# Patient Record
Sex: FEMALE | Race: WHITE | NOT HISPANIC OR LATINO | Employment: FULL TIME
[De-identification: names, ages, dates, MRNs, and addresses within clinical notes are randomized per-mention and may not be internally consistent; named-entity substitution may affect disease eponyms.]

---

## 2018-01-26 ENCOUNTER — HEALTH MAINTENANCE LETTER (OUTPATIENT)
Age: 48
End: 2018-01-26

## 2018-10-22 ENCOUNTER — TRANSFERRED RECORDS (OUTPATIENT)
Dept: HEALTH INFORMATION MANAGEMENT | Facility: CLINIC | Age: 48
End: 2018-10-22

## 2018-10-22 LAB — RETINOPATHY: NEGATIVE

## 2018-11-07 NOTE — PROGRESS NOTES
SUBJECTIVE:   CC: Flores Koroma is an 47 year old woman who presents for preventive health visit.     Physical   Annual:     Getting at least 3 servings of Calcium per day:  Yes    Bi-annual eye exam:  Yes    Dental care twice a year:  Yes    Sleep apnea or symptoms of sleep apnea:  None and Excessive snoring    Diet:  Carbohydrate counting    Frequency of exercise:  2-3 days/week    Duration of exercise:  15-30 minutes    Taking medications regularly:  Not Applicable    Medication side effects:  Not applicable    Additional concerns today:  Yes    Diabetes Follow-up      Patient is checking blood sugars: rarely.  Results range from 125 to 95 - 100    Diabetic concerns: None     Symptoms of hypoglycemia (low blood sugar): none     Paresthesias (numbness or burning in feet) or sores: No     Date of last diabetic eye exam: 3 weeks ago at [facility]. 10/22/2018     Near sighted / no changes.     BP Readings from Last 2 Encounters:   11/13/18 120/84   12/29/14 119/61     Hemoglobin A1C (%)   Date Value   11/25/2014 5.5   08/26/2013 5.5     LDL Cholesterol Calculated (mg/dL)   Date Value   11/25/2014 124   07/11/2014 126       Diabetes Management Resources  Hyperlipidemia Follow-Up      Rate your low fat/cholesterol diet?: good    Taking statin?  No    Other lipid medications/supplements?:  None    Hemorrhoids   She complains of having hemorrhoids recently. The patient notes that she is getting more hemorrhoids and she is not sure why. She notes that after she mows the lawn, she will get hemorrhoids. The patient states that she feels them, but they do not hurt. She also states that she had bleeding a month ago due to her hemorrhoids.      Menopause  The patient states that she is having menopause symptoms now and having hot flashes. She states that her Menopause started between 1.5 to 2 years ago. The patient states that she will occasionally get her period, and her most recent one was 2 weeks ago.      Stomach issues  The patient states that she had an ear infection last January and went to the [facility] clinic. She was prescribed Amoxicillin and it destroyed her stomach. She states that she is using probiotics to help, but her stomach has been irritating her since. The patient also states that she has diarrhea, but it may be due to her dumping syndrome.     Weight gain  The patient states that she is still gaining weight in her stomach area. She also states that she is doing a low carb diet and trying to eat better with her . She notes that she walks for exercise and mows the lawn. The patient states that she might try strength training.     Boils and eczema   The patient states that when she was a child and went through puberty, she had bad boils and eczema. She notes that her boils and eczema has came back and it may be due to menopause. She notes that the eczema is with blisters. The patient states that her boils will occur at random places on her body.     Blood sugar  The patient notes that she will check her blood sugar in the morning, and get a reading around 130. She is unsure why her morning blood sugar reading is so high. She states that after she eats, her blood sugar is between .     Cholesterol   The patient states that she is not interested in getting her cholesterol checked. She complains that her cholesterol has always been over 200 and she would not take cholesterol medication if she was prescribed it.     Today's PHQ-2 Score:   PHQ-2 ( 1999 Pf[employer]) 11/13/2018   Q1: Little interest or pleasure in doing things 0   Q2: Feeling down, depressed or hopeless 0   PHQ-2 Score 0   Q1: Little interest or pleasure in doing things Not at all   Q2: Feeling down, depressed or hopeless Not at all   PHQ-2 Score 0       Abuse: Current or Past(Physical, Sexual or Emotional)- No  Do you feel safe in your environment - Yes    Social History   Substance Use Topics     Smoking status: Former Smoker      Packs/day: 0.50     Years: 10.00     Types: Cigarettes     Quit date: 1999     Smokeless tobacco: Never Used      Comment: no smokers in household     Alcohol use Yes      Comment: rarely / holidays only glass of wine        Reviewed orders with patient.  Reviewed health maintenance and updated orders accordingly - Yes  BP Readings from Last 3 Encounters:   18 120/84   14 119/61   14 130/76    Wt Readings from Last 3 Encounters:   18 108.3 kg (238 lb 11.2 oz)   14 93 kg (205 lb)   14 93.6 kg (206 lb 4.8 oz)                  Patient Active Problem List   Diagnosis     Chronic rhinitis     Anemia     Type 2 diabetes mellitus without complication (H)     Elevated LFTs     Hyperlipidemia LDL goal <100     Morbid obesity (H)     Past Surgical History:   Procedure Laterality Date     ABDOMEN SURGERY       C  DELIVERY ONLY      , Low Cervical     COLONOSCOPY  2012    Procedure: COLONOSCOPY;  screening colonoscopy, ;  Surgeon: Lia Patel MD;  Location: [facility] OR     CYSTOSCOPY,DIL URETHRAL STRICTURE        COLONOSCOPY W/WO BRUSH/WASH  2005    Normal colon.  Internal hemorrhoids.      LAPAROSCOPY, SURGICAL; CHOLECYSTECTOMY      Cholecystectomy, Laparoscopic     [facility] REPAIR OF NASAL SEPTUM  2007       Social History   Substance Use Topics     Smoking status: Former Smoker     Packs/day: 0.50     Years: 10.00     Types: Cigarettes     Quit date: 1999     Smokeless tobacco: Never Used      Comment: no smokers in household     Alcohol use Yes      Comment: rarely / holidays only glass of wine      Family History   Problem Relation Age of Onset     Anesthesia Reaction Mother      heart stops     Arthritis Mother      osteo     Gynecology Mother      Hypertension Mother      Alcohol/Drug Father      HEART DISEASE Maternal Grandmother      Arthritis Paternal Grandmother      rhumatoid     Cancer Paternal Grandmother      Cancer  Paternal Grandfather      Diabetes Maternal Aunt      3     Diabetes Paternal Aunt      1     Cancer Paternal Aunt      1 breast     Obesity Maternal Aunt      Obesity Paternal Aunt      Diabetes Other      aunts, cousins         Current Outpatient Prescriptions   Medication Sig Dispense Refill     ACCU-CHEK COMPACT PLUS test strip USE TO TEST BLOOD SUGARS DAILY OR AS DIRECTED. 102 strip 3     ACCU-CHEK MULTICLIX LANCETS MISC Use to test blood sugar 4 times daily or as directed. 360 each 1     ACE NOT PRESCRIBED, INTENTIONAL, ACE Inhibitor not prescribed due to other       0 each 0     Blood Glucose Calibration (GLUCOSE CONTROL) solution 1 drop by Test Solution route every 30 days. DONTE 1 Bottle 3     Blood Glucose Monitoring Suppl (BLOOD GLUCOSE METER) KIT 1 Device 2 times daily 1 kit 0     valACYclovir (VALTREX) 1000 mg tablet TAKE 2 TABLETS BY MOUTH AT ONSET OF COLD SORE. 4 tablet 2     albuterol (ALBUTEROL) 108 (90 BASE) MCG/ACT inhaler Inhale 1-2 puffs into the lungs every 6 hours as needed for shortness of breath / dyspnea 1 Inhaler 1     aspirin 81 MG tablet Take 1 tablet by mouth daily.  3     fluticasone (FLONASE) 50 MCG/ACT nasal spray Spray 1-2 sprays into both nostrils daily (Patient not taking: Reported on 11/13/2018) 1 Package 11     STATIN NOT PRESCRIBED, INTENTIONAL, continuous prn. Statin not prescribed intentionally due to Refusal by patient 0 each 0     Allergies   Allergen Reactions     No Known Drug Allergies      Recent Labs   Lab Test  11/25/14   0947  07/11/14   0837  08/26/13   0904  02/19/13   0835  11/02/12   0903   07/24/12   1320   A1C  5.5   --   5.5  5.5  6.0   < >   --    LDL  124  126  135*  155*  148*   < >  Cannot estimate LDL when triglyceride exceeds 400 mg/dL  110   HDL  44*  26*  36*  41*  28*   < >  30*   TRIG  105  93  146  102  164*   < >  816*   ALT  26   --    --    --   22   --   74*   CR  0.66   --   0.64   --    --    --   0.53   GFRESTIMATED  >90  Non   American GFR Calc     --   >90   --    --    --   >90   GFRESTBLACK  >90   GFR Calc     --   >90   --    --    --   >90   POTASSIUM  3.9   --    --    --    --    --   4.0   TSH  1.00   --    --    --    --    --   0.79    < > = values in this interval not displayed.        Patient under age 50, mutual decision reflected in health maintenance.      Pertinent mammograms are reviewed under the imaging tab.  History of abnormal Pap smear: NO - age 30- 65 PAP every 3 years recommended  PAP / HPV 11/25/2014 12/20/2011 12/24/2009   PAP OTHER-NIL, See Result NIL NIL     Reviewed and updated as needed this visit by clinical staff  Tobacco  Allergies  Meds  Problems  Med Hx  Surg Hx  Fam Hx  Soc Hx          Reviewed and updated as needed this visit by Provider  Allergies  Meds  Problems       Review of Systems   Constitutional: Negative for chills and fever.   HENT: Negative for congestion, ear pain, hearing loss and sore throat.    Eyes: Negative for pain and visual disturbance.   Respiratory: Negative for cough and shortness of breath.    Cardiovascular: Negative for chest pain, palpitations and peripheral edema.   Gastrointestinal: Negative for abdominal pain, constipation, diarrhea, heartburn, hematochezia and nausea.   Breasts:  Negative for tenderness, breast mass and discharge.   Genitourinary: Negative for dysuria, frequency, genital sores, hematuria, pelvic pain, urgency, vaginal bleeding and vaginal discharge.   Musculoskeletal: Negative for arthralgias, joint swelling and myalgias.   Skin: Negative for rash.   Neurological: Negative for dizziness, weakness, headaches and paresthesias.   Psychiatric/Behavioral: Negative for mood changes. The patient is not nervous/anxious.      This document serves as a record of the services and decisions personally performed and made by Lia Patel MD. It was created on her behalf by Moni Pierre, a trained medical scribe. The creation of this  "document is based the provider's statements to the medical scribe.    Moni Pierre November 13, 2018 9:32 AM  OBJECTIVE:   /84  Pulse 71  Temp 97.9  F (36.6  C) (Temporal)  Resp 18  Ht 1.594 m (5' 2.75\")  Wt 108.3 kg (238 lb 11.2 oz)  LMP  (LMP Unknown)  SpO2 99%  Breastfeeding? No  BMI 42.62 kg/m2  Physical Exam  GENERAL APPEARANCE: healthy, alert and no distress. Obese.   EYES: Eyes grossly normal to inspection, PERRL and conjunctivae and sclerae normal  HENT: ear canals and TM's normal, nose and mouth without ulcers or lesions, oropharynx clear and oral mucous membranes moist  NECK: no adenopathy, no asymmetry, masses, or scars and thyroid normal to palpation  RESP: lungs clear to auscultation - no rales, rhonchi or wheezes  BREAST: normal without masses, tenderness or nipple discharge and no palpable axillary masses or adenopathy  CV: regular rate and rhythm, normal S1 S2, no S3 or S4, no murmur, click or rub, no peripheral edema and peripheral pulses strong  ABDOMEN: soft, nontender, no hepatosplenomegaly, no masses and bowel sounds normal.    (female): normal female external genitalia, normal urethral meatus, vaginal mucosal atrophy noted, normal cervix, adnexae, and uterus without masses or abnormal discharge  MS: no musculoskeletal defects are noted and gait is age appropriate without ataxia  SKIN: no suspicious lesions or rashes  NEURO: Normal strength and tone, sensory exam grossly normal, mentation intact and speech normal  PSYCH: mentation appears normal and affect normal/bright  RECTAL: visual exam with hemorrhoid tag noted  Diabetic foot exam: normal DP and PT pulses, no trophic changes or ulcerative lesions, normal sensory exam and normal monofilament exam   Diagnostic Test Results:  No results found for this or any previous visit (from the past 24 hour(s)).      ASSESSMENT/PLAN:   1. Encounter for routine adult health examination without abnormal findings  Physical exam was " administered today.  See counseling messages below.   Pap done. Will contact with results when available.       2. Type 2 diabetes mellitus without complication, without long-term current use of insulin (H)  Patient is due for labs today.  Patient reports that she is not interested in getting her cholesterol checked.  Labs have been ordered. Will notify with results.  Adjust therapy as needed.   She absolutely refuses to have lipid panel drawn. She is not interested in knowing results as she said she will decline medication for this. Discussed risk factors for heart disease and stroke which includes cholesterol.   She still declines.   - TSH with free T4 reflex  - Hemoglobin A1c  - Comprehensive metabolic panel (BMP + Alb, Alk Phos, ALT, AST, Total. Bili, TP)  - Albumin Random Urine Quantitative with Creat Ratio  - FOOT EXAM    3. Morbid obesity (H)  Discussed importance of healthy lifestyle with patient.   Discussed exercise and diet options with patient.  Patient reports that she is modifying her lifestyle, with the help of her .     4. Weight gain  Patient reports gaining weight, mainly on her stomach.   Patient states that she walks and mows the lawn for exercise.   Discussed importance of healthy lifestyle choices with patient.     5. Hyperlipidemia LDL goal <100  Patient reports that she is not interested in checking cholesterol today.  Patient states that she will not take cholesterol medication if prescribed.   Patient notes that morning blood sugars are around 130.   Blood sugars after eating between .    6. Elevated LFTs  Patient has history of LFTs.   Patient is doing well at this time.     7. External hemorrhoids  Patient reports of having hemorrhoids recently.   Patient states that hemorrhoids are not painful, but she can feel them.   Patient reports of having bleeding a month ago due to hemorrhoids.  Visual rectum exam showed hemorrhoid tag.     8. Eczema, unspecified type  Patient reports  "having eczema when she was a child going through puberty.  Patient reports having perimenopause symptoms.  Patient states that her eczema has started again recently.  She notes that she uses lotions at home for treatment.   At home treatments and lotions have been discussed with patient.   New prescription sent.     9. Family history of colon cancer  Patient has family history of colon cancer.   Patient is due for colonoscopy.  Colonoscopy has been ordered and referral has been given.   Patient will schedule appointment.   - GASTROENTEROLOGY ADULT REF PROCEDURE ONLY [facility] [phone]; Dr. DRE Patel (colonoscopy only)    10. Irregular menses  Patient reports of having perimenopause symptoms.   Patient states that her periods will occur on occasion.  Most recent period was 2 weeks ago but only lasted a few days.  Labs have been ordered.  Will notify with results.   - TSH with free T4 reflex  - Follicle stimulating hormone    COUNSELING:  Special attention given to:        Regular exercise       Healthy diet/nutrition    BP Readings from Last 1 Encounters:   11/13/18 120/84     Estimated body mass index is 42.62 kg/(m^2) as calculated from the following:    Height as of this encounter: 1.594 m (5' 2.75\").    Weight as of this encounter: 108.3 kg (238 lb 11.2 oz).      Weight management plan: Discussed healthy diet and exercise guidelines and patient will follow up in 12 months in clinic to re-evaluate.     reports that she quit smoking about 19 years ago. Her smoking use included Cigarettes. She has a 5.00 pack-year smoking history. She has never used smokeless tobacco.      Counseling Resources:  ATP IV Guidelines  Pooled Cohorts Equation Calculator  Breast Cancer Risk Calculator  FRAX Risk Assessment  ICSI Preventive Guidelines  Dietary Guidelines for Americans, 2010  USDA's MyPlate  ASA Prophylaxis  Lung CA Screening    The information in this document, created by the medical scribe for me, " accurately reflects the services I personally performed and the decisions made by me. I have reviewed and approved this document for accuracy prior to leaving the patient care area.    RENETTA NEGRON MD, MD  [facility]

## 2018-11-13 ENCOUNTER — OFFICE VISIT (OUTPATIENT)
Dept: FAMILY MEDICINE | Facility: CLINIC | Age: 48
End: 2018-11-13
Payer: COMMERCIAL

## 2018-11-13 VITALS
BODY MASS INDEX: 42.29 KG/M2 | RESPIRATION RATE: 18 BRPM | HEIGHT: 63 IN | OXYGEN SATURATION: 99 % | TEMPERATURE: 97.9 F | SYSTOLIC BLOOD PRESSURE: 120 MMHG | WEIGHT: 238.7 LBS | HEART RATE: 71 BPM | DIASTOLIC BLOOD PRESSURE: 84 MMHG

## 2018-11-13 DIAGNOSIS — Z80.0 FAMILY HISTORY OF COLON CANCER: ICD-10-CM

## 2018-11-13 DIAGNOSIS — K64.4 EXTERNAL HEMORRHOIDS: ICD-10-CM

## 2018-11-13 DIAGNOSIS — N92.6 IRREGULAR MENSES: ICD-10-CM

## 2018-11-13 DIAGNOSIS — R63.5 WEIGHT GAIN: ICD-10-CM

## 2018-11-13 DIAGNOSIS — Z23 NEED FOR VACCINATION: ICD-10-CM

## 2018-11-13 DIAGNOSIS — L30.9 ECZEMA, UNSPECIFIED TYPE: ICD-10-CM

## 2018-11-13 DIAGNOSIS — Z00.00 ENCOUNTER FOR ROUTINE ADULT HEALTH EXAMINATION WITHOUT ABNORMAL FINDINGS: Primary | ICD-10-CM

## 2018-11-13 DIAGNOSIS — Z12.4 SCREENING FOR MALIGNANT NEOPLASM OF CERVIX: ICD-10-CM

## 2018-11-13 DIAGNOSIS — E11.9 TYPE 2 DIABETES MELLITUS WITHOUT COMPLICATION, WITHOUT LONG-TERM CURRENT USE OF INSULIN (H): ICD-10-CM

## 2018-11-13 DIAGNOSIS — E78.5 HYPERLIPIDEMIA LDL GOAL <100: ICD-10-CM

## 2018-11-13 DIAGNOSIS — R79.89 ELEVATED LFTS: ICD-10-CM

## 2018-11-13 DIAGNOSIS — E66.01 MORBID OBESITY (H): ICD-10-CM

## 2018-11-13 LAB
ALBUMIN SERPL-MCNC: 3.8 G/DL (ref 3.4–5)
ALP SERPL-CCNC: 84 U/L (ref 40–150)
ALT SERPL W P-5'-P-CCNC: 29 U/L (ref 0–50)
ANION GAP SERPL CALCULATED.3IONS-SCNC: 5 MMOL/L (ref 3–14)
AST SERPL W P-5'-P-CCNC: 26 U/L (ref 0–45)
BILIRUB SERPL-MCNC: 0.4 MG/DL (ref 0.2–1.3)
BUN SERPL-MCNC: 15 MG/DL (ref 7–30)
CALCIUM SERPL-MCNC: 9.1 MG/DL (ref 8.5–10.1)
CHLORIDE SERPL-SCNC: 105 MMOL/L (ref 94–109)
CO2 SERPL-SCNC: 30 MMOL/L (ref 20–32)
CREAT SERPL-MCNC: 0.74 MG/DL (ref 0.52–1.04)
CREAT UR-MCNC: 180 MG/DL
FSH SERPL-ACNC: 28.4 IU/L
GFR SERPL CREATININE-BSD FRML MDRD: 84 ML/MIN/1.7M2
GLUCOSE SERPL-MCNC: 115 MG/DL (ref 70–99)
HBA1C MFR BLD: 6 % (ref 0–5.6)
MICROALBUMIN UR-MCNC: 16 MG/L
MICROALBUMIN/CREAT UR: 8.67 MG/G CR (ref 0–25)
POTASSIUM SERPL-SCNC: 3.9 MMOL/L (ref 3.4–5.3)
PROT SERPL-MCNC: 8.5 G/DL (ref 6.8–8.8)
SODIUM SERPL-SCNC: 140 MMOL/L (ref 133–144)
TSH SERPL DL<=0.005 MIU/L-ACNC: 1.34 MU/L (ref 0.4–4)

## 2018-11-13 PROCEDURE — G0145 SCR C/V CYTO,THINLAYER,RESCR: HCPCS | Performed by: FAMILY MEDICINE

## 2018-11-13 PROCEDURE — 36415 COLL VENOUS BLD VENIPUNCTURE: CPT | Performed by: FAMILY MEDICINE

## 2018-11-13 PROCEDURE — 87624 HPV HI-RISK TYP POOLED RSLT: CPT | Performed by: FAMILY MEDICINE

## 2018-11-13 PROCEDURE — 83001 ASSAY OF GONADOTROPIN (FSH): CPT | Performed by: FAMILY MEDICINE

## 2018-11-13 PROCEDURE — 90714 TD VACC NO PRESV 7 YRS+ IM: CPT | Performed by: FAMILY MEDICINE

## 2018-11-13 PROCEDURE — 84443 ASSAY THYROID STIM HORMONE: CPT | Performed by: FAMILY MEDICINE

## 2018-11-13 PROCEDURE — 99396 PREV VISIT EST AGE 40-64: CPT | Mod: 25 | Performed by: FAMILY MEDICINE

## 2018-11-13 PROCEDURE — 80053 COMPREHEN METABOLIC PANEL: CPT | Performed by: FAMILY MEDICINE

## 2018-11-13 PROCEDURE — 90471 IMMUNIZATION ADMIN: CPT | Performed by: FAMILY MEDICINE

## 2018-11-13 PROCEDURE — 83036 HEMOGLOBIN GLYCOSYLATED A1C: CPT | Performed by: FAMILY MEDICINE

## 2018-11-13 PROCEDURE — 82043 UR ALBUMIN QUANTITATIVE: CPT | Performed by: FAMILY MEDICINE

## 2018-11-13 PROCEDURE — 99207 C FOOT EXAM  NO CHARGE: CPT | Mod: 25 | Performed by: FAMILY MEDICINE

## 2018-11-13 RX ORDER — TRIAMCINOLONE ACETONIDE 1 MG/G
CREAM TOPICAL
Qty: 15 G | Refills: 1 | Status: SHIPPED | OUTPATIENT
Start: 2018-11-13 | End: 2019-07-09

## 2018-11-13 ASSESSMENT — ENCOUNTER SYMPTOMS
HEMATURIA: 0
CONSTIPATION: 0
ABDOMINAL PAIN: 0
NAUSEA: 0
FREQUENCY: 0
DYSURIA: 0
WEAKNESS: 0
FEVER: 0
PALPITATIONS: 0
CHILLS: 0
BREAST MASS: 0
NERVOUS/ANXIOUS: 0
DIARRHEA: 0
SORE THROAT: 0
SHORTNESS OF BREATH: 0
HEADACHES: 0
DIZZINESS: 0
EYE PAIN: 0
HEMATOCHEZIA: 0
JOINT SWELLING: 1
ARTHRALGIAS: 0
JOINT SWELLING: 0
COUGH: 0
PARESTHESIAS: 0
MYALGIAS: 0
HEARTBURN: 0

## 2018-11-13 ASSESSMENT — PAIN SCALES - GENERAL: PAINLEVEL: NO PAIN (0)

## 2018-11-13 NOTE — MR AVS SNAPSHOT
After Visit Summary   11/13/2018    Flores Koroma    MRN: [id number]           Patient Information     Date Of Birth          1970        Visit Information        Provider Department      11/13/2018 9:20 AM Lia Patel MD [facility]        Today's Diagnoses     Encounter for routine adult health examination without abnormal findings    -  1    Type 2 diabetes mellitus without complication, without long-term current use of insulin (H)        Morbid obesity (H)        Weight gain        Hyperlipidemia LDL goal <100        Elevated LFTs        External hemorrhoids        Eczema, unspecified type        Family history of colon cancer        Irregular menses          Care Instructions      Preventive Health Recommendations  Female Ages 40 to 49    Yearly exam:     See your health care provider every year in order to  1. Review health changes.   2. Discuss preventive care.    3. Review your medicines if your doctor prescribed any.      Get a Pap test every three years (unless you have an abnormal result and your provider advises testing more often).      If you get Pap tests with HPV test, you only need to test every 5 years, unless you have an abnormal result. You do not need a Pap test if your uterus was removed (hysterectomy) and you have not had cancer.      You should be tested each year for STDs (sexually transmitted diseases), if you're at risk.     Ask your doctor if you should have a mammogram.      Have a colonoscopy (test for colon cancer) if someone in your family has had colon cancer or polyps before age 50.       Have a cholesterol test every 5 years.       Have a diabetes test (fasting glucose) after age 45. If you are at risk for diabetes, you should have this test every 3 years.    Shots: Get a flu shot each year. Get a tetanus shot every 10 years.     Nutrition:     Eat at least 5 servings of fruits and vegetables each day.    Eat whole-grain bread,  whole-wheat pasta and brown rice instead of white grains and rice.    Get adequate Calcium and Vitamin D.      Lifestyle    Exercise at least 150 minutes a week (an average of 30 minutes a day, 5 days a week). This will help you control your weight and prevent disease.    Limit alcohol to one drink per day.    No smoking.     Wear sunscreen to prevent skin cancer.    See your dentist every six months for an exam and cleaning.          Follow-ups after your visit        Additional Services     GASTROENTEROLOGY ADULT REF PROCEDURE ONLY [facility] [phone]; Dr. DRE Patel (colonoscopy only)       Last Lab Result: Creatinine (mg/dL)       Date                     Value                 11/25/2014               0.66             ----------  Body mass index is 42.62 kg/(m^2).     Needed:  No  Language:  English    Patient will be contacted to schedule procedure.     Please be aware that coverage of these services is subject to the terms and limitations of your health insurance plan.  Call member services at your health plan with any benefit or coverage questions.  Any procedures must be performed at a [facility] facility OR coordinated by your clinic's referral office.    Please bring the following with you to your appointment:    (1) Any X-Rays, CTs or MRIs which have been performed.  Contact the facility where they were done to arrange for  prior to your scheduled appointment.    (2) List of current medications   (3) This referral request   (4) Any documents/labs given to you for this referral                  Follow-up notes from your care team     Return in about 6 months (around 5/13/2019) for Physical Exam, Diabetes recheck.      Who to contact     If you have questions or need follow up information about today's clinic visit or your schedule please contact [facility] directly at [phone].  Normal or non-critical lab and imaging results will be communicated to you by  "MyChart, letter or phone within 4 business days after the clinic has received the results. If you do not hear from us within 7 days, please contact the clinic through Leeot or phone. If you have a critical or abnormal lab result, we will notify you by phone as soon as possible.  Submit refill requests through Neitui or call your pharmacy and they will forward the refill request to us. Please allow 3 business days for your refill to be completed.          Additional Information About Your Visit        ReverbeoharOpTrip Information     Neitui gives you secure access to your electronic health record. If you see a primary care provider, you can also send messages to your care team and make appointments. If you have questions, please call your primary care clinic.  If you do not have a primary care provider, please call [phone] and they will assist you.        Care EveryWhere ID     This is your Care EveryWhere ID. This could be used by other organizations to access your [facility] medical records  [id number]        Your Vitals Were     Pulse Temperature Respirations Height Last Period Pulse Oximetry    71 97.9  F (36.6  C) (Temporal) 18 5' 2.75\" (1.594 m) (LMP Unknown) 99%    Breastfeeding? BMI (Body Mass Index)                No 42.62 kg/m2           Blood Pressure from Last 3 Encounters:   11/13/18 120/84   12/29/14 119/61   11/25/14 130/76    Weight from Last 3 Encounters:   11/13/18 238 lb 11.2 oz (108.3 kg)   12/29/14 205 lb (93 kg)   11/25/14 206 lb 4.8 oz (93.6 kg)              We Performed the Following     Albumin Random Urine Quantitative with Creat Ratio     Comprehensive metabolic panel (BMP + Alb, Alk Phos, ALT, AST, Total. Bili, TP)     Follicle stimulating hormone     FOOT EXAM     GASTROENTEROLOGY ADULT REF PROCEDURE ONLY [facility] [phone]; Dr. DRE Patel (colonoscopy only)     Hemoglobin A1c     TSH with free T4 reflex        Primary Care Provider Office Phone # Fax #    Lia Patel, " [phone]       [address] [facility] [address]        Equal Access to Services     ABRAM DELONG : Hadii aad ku haddashawnbrenda Sera, wakarunada luqmartin, qaybta kaalmada jaycob, maryan audreyin hayaan tomyfede sepulveda laGraysonbrent berrios. So [facility] [phone].    ATENCIÓN: Si habla español, tiene a tripathi disposición servicios gratuitos de asistencia lingüística. Llame al [phone].    We comply with applicable federal civil rights laws and [place] laws. We do not discriminate on the basis of race, color, national origin, age, disability, sex, sexual orientation, or gender identity.            Thank you!     Thank you for choosing [facility]  for your care. Our goal is always to provide you with excellent care. Hearing back from our patients is one way we can continue to improve our services. Please take a few minutes to complete the written survey that you may receive in the mail after your visit with us. Thank you!             Your Updated Medication List - Protect others around you: Learn how to safely use, store and throw away your medicines at www.disposemymeds.org.          This list is accurate as of 11/13/18 10:07 AM.  Always use your most recent med list.                   Brand Name Dispense Instructions for use Diagnosis    ACCU-CHEK COMPACT PLUS test strip   Generic drug:  blood glucose monitoring     102 strip    USE TO TEST BLOOD SUGARS DAILY OR AS DIRECTED.    Type 2 diabetes, HbA1c goal < 7% (H)       * ACE NOT PRESCRIBED (INTENTIONAL)     0 each    ACE Inhibitor not prescribed due to other    Type 2 diabetes, HbA1c goal < 7% (H)       albuterol 108 (90 Base) MCG/ACT inhaler    PROAIR HFA    1 Inhaler    Inhale 1-2 puffs into the lungs every 6 hours as needed for shortness of breath / dyspnea    Cough       aspirin 81 MG tablet      Take 1 tablet by mouth daily.    Type 2 diabetes, HbA1c goal < 7% (H)       blood glucose calibration solution    no brand specified    1 Bottle    1  drop by Test Solution route every 30 days. DONTE    Type 2 diabetes, HbA1c goal < 7% (H)       blood glucose monitoring lancets     360 each    Use to test blood sugar 4 times daily or as directed.    Type 2 diabetes, HbA1c goal < 7% (H)       blood glucose monitoring meter device kit    no brand specified    1 kit    1 Device 2 times daily    Type 2 diabetes, HbA1c goal < 7% (H)       fluticasone 50 MCG/ACT spray    FLONASE    1 Package    Spray 1-2 sprays into both nostrils daily    Chronic rhinitis       * STATIN NOT PRESCRIBED (INTENTIONAL)     0 each    continuous prn. Statin not prescribed intentionally due to Refusal by patient    Hyperlipidemia LDL goal <100       valACYclovir 1000 mg tablet    VALTREX    4 tablet    TAKE 2 TABLETS BY MOUTH AT ONSET OF COLD SORE.    Herpes simplex without mention of complication       * Notice:  This list has 2 medication(s) that are the same as other medications prescribed for you. Read the directions carefully, and ask your doctor or other care provider to review them with you.

## 2018-11-15 LAB
COPATH REPORT: NORMAL
PAP: NORMAL

## 2018-11-20 LAB
FINAL DIAGNOSIS: NORMAL
HPV HR 12 DNA CVX QL NAA+PROBE: NEGATIVE
HPV16 DNA SPEC QL NAA+PROBE: NEGATIVE
HPV18 DNA SPEC QL NAA+PROBE: NEGATIVE
SPECIMEN DESCRIPTION: NORMAL
SPECIMEN SOURCE CVX/VAG CYTO: NORMAL

## 2019-06-27 ENCOUNTER — TELEPHONE (OUTPATIENT)
Dept: FAMILY MEDICINE | Facility: CLINIC | Age: 49
End: 2019-06-27

## 2019-06-27 NOTE — TELEPHONE ENCOUNTER
Summary:    Patient is due/failing the following:   Diabetic follow up, Eye exam, A1C, LDL and mammogram     Action needed:   Patient needs office visit for diabetic follow up., Patient needs fasting lab only appointment and schedule an eye exam    Type of outreach:    Phone, left message for patient to call back.     Questions for provider review:    None                                                                                                                                    Kathie Solis       Chart routed to Care Team .          Panel Management Review      Patient has the following on her problem list:     Diabetes    ASA: Passed    Last A1C  Lab Results   Component Value Date    A1C 6.0 11/13/2018    A1C 5.5 11/25/2014    A1C 5.5 08/26/2013    A1C 5.5 02/19/2013    A1C 6.0 11/02/2012     A1C tested: Passed    Last LDL:    Lab Results   Component Value Date    CHOL 189 11/25/2014     Lab Results   Component Value Date    HDL 44 11/25/2014     Lab Results   Component Value Date     11/25/2014     Lab Results   Component Value Date    TRIG 105 11/25/2014     Lab Results   Component Value Date    CHOLHDLRATIO 4.3 11/25/2014     No results found for: NHDL    Is the patient on a Statin? NO             Is the patient on Aspirin? YES    Medications     HMG CoA Reductase Inhibitors     STATIN NOT PRESCRIBED, INTENTIONAL,       Salicylates     aspirin 81 MG tablet             Last three blood pressure readings:  BP Readings from Last 3 Encounters:   11/13/18 120/84   12/29/14 119/61   11/25/14 130/76            Tobacco History:     History   Smoking Status     Former Smoker     Packs/day: 0.50     Years: 10.00     Types: Cigarettes     Quit date: 1/25/1999   Smokeless Tobacco     Never Used     Comment: no smokers in household           Composite cancer screening  Chart review shows that this patient is due/due soon for the following None

## 2019-06-27 NOTE — LETTER
[facility]  [address], [address]  [place] [address]  Phone: [phone]  Fax: [phone]  July 1, 2019      Flores Koroma  [address] [place] [address]  [facility] [address]      Dear Flores,    We care about your health and have reviewed your health plan including your medical conditions, medications, and lab results.  Based on this review, it is recommended that you follow up regarding the following health topic(s):  -Diabetes  -Breast Cancer Screening    We recommend you take the following action(s):  -schedule a FOLLOWUP OFFICE APPOINTMENT.  We will perform the following labs:  A1c and Lipids (fasting cholesterol - nothing to eat except water and/or meds for 8-10 hours).  -schedule a MAMMOGRAM which is due. Please disregard this reminder if you have had this exam elsewhere within the last 1-2 years please let us know so we can update your records.     Please call us at the [facility] - [phone] (or use Zoom Telephonics) to address the above recommendations.     Thank you for trusting [facility] and we appreciate the opportunity to serve you.  We look forward to supporting your healthcare needs in the future.    Healthy Regards,    Your Health Care Team  [facility]

## 2019-07-01 ENCOUNTER — TELEPHONE (OUTPATIENT)
Dept: FAMILY MEDICINE | Facility: CLINIC | Age: 49
End: 2019-07-01

## 2019-07-10 ENCOUNTER — MYC MEDICAL ADVICE (OUTPATIENT)
Dept: FAMILY MEDICINE | Facility: CLINIC | Age: 49
End: 2019-07-10

## 2019-07-10 DIAGNOSIS — K91.1 DUMPING SYNDROME: Primary | ICD-10-CM

## 2019-07-10 RX ORDER — CHOLESTYRAMINE LIGHT 4 G/5.7G
4 POWDER, FOR SUSPENSION ORAL 2 TIMES DAILY WITH MEALS
Qty: 60 PACKET | Refills: 4 | Status: SHIPPED | OUTPATIENT
Start: 2019-07-10 | End: 2019-11-13

## 2019-07-30 NOTE — TELEPHONE ENCOUNTER
Please abstract the following data from this visit with this patient into the appropriate field in Epic:    Eye exam with ophthalmology on this date: [facility] eye. 10/22/2018      Kathie Solis

## 2019-08-02 NOTE — PROGRESS NOTES
Subjective     Flores Koroma is a 48 year old female who presents to clinic today for the following health issues:    History of Present Illness        Diabetes:   She presents for follow up of diabetes.  She is checking home blood glucose a few times a month. She checks blood glucose before and after meals.  Blood glucose is never over 200 and never under 70. When her blood glucose is low, the patient is asymptomatic for confusion, blurred vision, lethargy and reports not feeling dizzy, shaky, or weak.  She has no concerns regarding her diabetes at this time.  She is having weight gain. The patient has had a diabetic eye exam in the last 12 months. Eye exam performed on dont remember. last fall.    Diabetes Management Resources    She eats 2-3 servings of fruits and vegetables daily.She consumes 0 sweetened beverage(s) daily.  She is taking medications regularly.       BP Readings from Last 2 Encounters:   08/07/19 120/82   11/13/18 120/84     Hemoglobin A1C (%)   Date Value   08/07/2019 6.6 (H)   11/13/2018 6.0 (H)     LDL Cholesterol Calculated (mg/dL)   Date Value   11/25/2014 124   07/11/2014 126       Diabetes Management Resources    Amount of exercise or physical activity: 6-7 days/week for an average of 30-45 minutes    Problems taking medications regularly: No    Medication side effects: none    Diet: carbohydrate counting    Diabetes Mellitus Type 2    Currently diet controlled and is taking no medications for diabetes. Reports that her readings which are typically in the 110 range in the morning and about 140 after meals in the afternoons/ evenings. Continues with regular eye exams and last appointment was October of 2018.    Lab Results   Component Value Date    A1C 6.0 11/13/2018    A1C 5.5 11/25/2014    A1C 5.5 08/26/2013    A1C 5.5 02/19/2013    A1C 6.0 11/02/2012     Knee Swelling Right   This has been an intermittent problem. She dose have a prior history of a knee injury. Denies associated  symptoms of pain, but the swelling is noticeable and can limit her range of motion with this joint.     Additional Information   Had a recent flare of back pain a few weeks ago but this seemed to have resolved. Reports that her eczema is much better in the summertime. She declines cholesterol panel because she says that she would refuse treatment anyway. Not participating in mammograms due to fear of radiation from the screening. She planned to get a colonoscopy in the past but was unable to follow-up for this, she would like a new referral for this.     Patient Active Problem List   Diagnosis     Chronic rhinitis     Anemia     Type 2 diabetes mellitus without complication (H)     Elevated LFTs     Hyperlipidemia LDL goal <100     Morbid obesity (H)     Past Surgical History:   Procedure Laterality Date     ABDOMEN SURGERY       C  DELIVERY ONLY      , Low Cervical     COLONOSCOPY  2012    Procedure: COLONOSCOPY;  screening colonoscopy, ;  Surgeon: Lia Patel MD;  Location: [facility] OR     CYSTOSCOPY,DIL URETHRAL STRICTURE       [facility] COLONOSCOPY W/WO BRUSH/WASH  2005    Normal colon.  Internal hemorrhoids.     HC LAPAROSCOPY, SURGICAL; CHOLECYSTECTOMY      Cholecystectomy, Laparoscopic     [facility] REPAIR OF NASAL SEPTUM  2007       Social History     Tobacco Use     Smoking status: Former Smoker     Packs/day: 0.50     Years: 10.00     Pack years: 5.00     Types: Cigarettes     Last attempt to quit: 1999     Years since quittin.5     Smokeless tobacco: Never Used     Tobacco comment: no smokers in household   Substance Use Topics     Alcohol use: Yes     Comment: rarely / holidays only glass of wine      Family History   Problem Relation Age of Onset     Anesthesia Reaction Mother         heart stops     Arthritis Mother         osteo     Gynecology Mother      Hypertension Mother      Alcohol/Drug Father      Heart Disease Maternal Grandmother      Arthritis  "Paternal Grandmother         rhumatoid     Cancer Paternal Grandmother      Cancer Paternal Grandfather      Diabetes Maternal Aunt         3     Diabetes Paternal Aunt         1     Cancer Paternal Aunt         1 breast     Obesity Maternal Aunt      Obesity Paternal Aunt      Diabetes Other         aunts, cousins         BP Readings from Last 3 Encounters:   08/07/19 120/82   11/13/18 120/84   12/29/14 119/61    Wt Readings from Last 3 Encounters:   08/07/19 111.1 kg (245 lb)   11/13/18 108.3 kg (238 lb 11.2 oz)   12/29/14 93 kg (205 lb)         Reviewed and updated as needed this visit by Provider  Tobacco  Allergies  Meds  Problems  Med Hx  Surg Hx  Fam Hx       Review of Systems   ROS COMP: INTEGUMENTARY/SKIN: POSITIVE for dry skin  MUSCULOSKELETAL: POSITIVE  for right knee swelling, limited range of motion and NEGATIVE for pain    This document serves as a record of the services and decisions personally performed and made by Lia Patel MD. It was created on his behalf by Lamont Fang, a trained medical scribe. The creation of this document is based on the provider's statements to the medical scribe.  Lamont Fang 5:13 PM August 7, 2019      Objective    /82   Pulse 68   Temp 97.8  F (36.6  C)   Resp 14   Ht 1.6 m (5' 3\")   Wt 111.1 kg (245 lb)   LMP  (LMP Unknown)   Breastfeeding? No   BMI 43.40 kg/m    Body mass index is 43.4 kg/m .  Physical Exam   EYES: Eyes grossly normal to inspection, PERRL and conjunctivae and sclerae normal  RESP: lungs clear to auscultation - no rales, rhonchi or wheezes  CV: regular rate and rhythm, normal S1 S2, no S3 or S4, no murmur, click or rub, no peripheral edema and peripheral pulses strong  NEURO: Sensory exam of the foot is normal, tested with the monofilament. Good pulses, no lesions or ulcers, good peripheral pulses.    Diagnostic Test Results:  Labs reviewed in Epic  No results found for this or any previous visit (from the past 24 " hour(s)).      Assessment & Plan     1. Type 2 diabetes mellitus without complication, without long-term current use of insulin (H)  Awaiting results. Dose adjustment as needed.    Seems to be doing well.   Continue with dietary management and increase exercise.   All questions invited, asked and answered to the patient's apparent satisfaction.  Patient agrees to plan.    - Hemoglobin A1c    2. Hyperlipidemia LDL goal <100  Patient declines screening due to the fact that she would refuse treatment with medication  Discussed the importance of controlling risk factors for heart disease. She declines.       Patient also decline mammogram at this time.        Return in about 6 months (around 2/7/2020) for Diabetes recheck, Physical Exam.    The information in this document, created by the medical scribe for me, accurately reflects the services I personally performed and the decisions made by me. I have reviewed and approved this document for accuracy prior to leaving the patient care area.  August 7, 2019 5:18 PM    RENETTA NEGRON MD, MD  [facility]

## 2019-08-07 ENCOUNTER — OFFICE VISIT (OUTPATIENT)
Dept: FAMILY MEDICINE | Facility: CLINIC | Age: 49
End: 2019-08-07
Payer: COMMERCIAL

## 2019-08-07 VITALS
DIASTOLIC BLOOD PRESSURE: 82 MMHG | HEART RATE: 68 BPM | HEIGHT: 63 IN | WEIGHT: 245 LBS | SYSTOLIC BLOOD PRESSURE: 120 MMHG | TEMPERATURE: 97.8 F | RESPIRATION RATE: 14 BRPM | BODY MASS INDEX: 43.41 KG/M2

## 2019-08-07 DIAGNOSIS — E11.9 TYPE 2 DIABETES MELLITUS WITHOUT COMPLICATION, WITHOUT LONG-TERM CURRENT USE OF INSULIN (H): Primary | ICD-10-CM

## 2019-08-07 DIAGNOSIS — E78.5 HYPERLIPIDEMIA LDL GOAL <100: ICD-10-CM

## 2019-08-07 LAB — HBA1C MFR BLD: 6.6 % (ref 0–5.6)

## 2019-08-07 PROCEDURE — 99214 OFFICE O/P EST MOD 30 MIN: CPT | Performed by: FAMILY MEDICINE

## 2019-08-07 PROCEDURE — 36416 COLLJ CAPILLARY BLOOD SPEC: CPT | Performed by: FAMILY MEDICINE

## 2019-08-07 PROCEDURE — 83036 HEMOGLOBIN GLYCOSYLATED A1C: CPT | Performed by: FAMILY MEDICINE

## 2019-08-07 ASSESSMENT — PAIN SCALES - GENERAL: PAINLEVEL: NO PAIN (0)

## 2019-08-07 ASSESSMENT — MIFFLIN-ST. JEOR: SCORE: 1700.44

## 2019-08-07 NOTE — PATIENT INSTRUCTIONS
1) You will be due for an annual eye exam at the end of October.     2) We will repeat your hemoglobin A1C test.     3) I placed another referral for the colonoscopy.

## 2019-09-13 ASSESSMENT — MIFFLIN-ST. JEOR: SCORE: 1677.76

## 2019-09-20 ENCOUNTER — HOSPITAL ENCOUNTER (OUTPATIENT)
Facility: AMBULATORY SURGERY CENTER | Age: 49
Discharge: HOME | End: 2019-09-20
Attending: FAMILY MEDICINE | Admitting: FAMILY MEDICINE
Payer: COMMERCIAL

## 2019-09-20 VITALS
BODY MASS INDEX: 42.52 KG/M2 | OXYGEN SATURATION: 94 % | HEIGHT: 63 IN | HEART RATE: 73 BPM | TEMPERATURE: 97.6 F | RESPIRATION RATE: 16 BRPM | SYSTOLIC BLOOD PRESSURE: 121 MMHG | WEIGHT: 240 LBS | DIASTOLIC BLOOD PRESSURE: 73 MMHG

## 2019-09-20 LAB
COLONOSCOPY: NORMAL
GLUCOSE BLDC GLUCOMTR-MCNC: 124 MG/DL (ref 70–99)

## 2019-09-20 PROCEDURE — 99152 MOD SED SAME PHYS/QHP 5/>YRS: CPT | Mod: 59 | Performed by: FAMILY MEDICINE

## 2019-09-20 PROCEDURE — G0121 COLON CA SCRN NOT HI RSK IND: HCPCS | Performed by: FAMILY MEDICINE

## 2019-09-20 PROCEDURE — G8918 PT W/O PREOP ORDER IV AB PRO: HCPCS

## 2019-09-20 PROCEDURE — 99153 MOD SED SAME PHYS/QHP EA: CPT | Performed by: FAMILY MEDICINE

## 2019-09-20 PROCEDURE — 45378 DIAGNOSTIC COLONOSCOPY: CPT

## 2019-09-20 PROCEDURE — G8907 PT DOC NO EVENTS ON DISCHARG: HCPCS

## 2019-09-20 RX ORDER — FENTANYL CITRATE 50 UG/ML
INJECTION, SOLUTION INTRAMUSCULAR; INTRAVENOUS PRN
Status: DISCONTINUED | OUTPATIENT
Start: 2019-09-20 | End: 2019-09-20 | Stop reason: HOSPADM

## 2019-09-20 RX ORDER — ONDANSETRON 2 MG/ML
4 INJECTION INTRAMUSCULAR; INTRAVENOUS
Status: DISCONTINUED | OUTPATIENT
Start: 2019-09-20 | End: 2019-09-21 | Stop reason: HOSPADM

## 2019-09-20 RX ORDER — LIDOCAINE 40 MG/G
CREAM TOPICAL
Status: DISCONTINUED | OUTPATIENT
Start: 2019-09-20 | End: 2019-09-21 | Stop reason: HOSPADM

## 2019-10-26 ENCOUNTER — HEALTH MAINTENANCE LETTER (OUTPATIENT)
Age: 49
End: 2019-10-26

## 2019-11-13 ENCOUNTER — TELEPHONE (OUTPATIENT)
Dept: FAMILY MEDICINE | Facility: CLINIC | Age: 49
End: 2019-11-13

## 2019-11-13 DIAGNOSIS — K91.1 DUMPING SYNDROME: ICD-10-CM

## 2019-11-13 RX ORDER — CHOLESTYRAMINE LIGHT 4 G/5.7G
4 POWDER, FOR SUSPENSION ORAL 2 TIMES DAILY WITH MEALS
Qty: 180 PACKET | Refills: 1 | Status: SHIPPED | OUTPATIENT
Start: 2019-11-13 | End: 2021-02-03

## 2019-11-13 NOTE — TELEPHONE ENCOUNTER
Summary:    Patient is due/failing the following:   Mammogram, Eye exam, Microalbumin, CMP, BMP, LDL and PHYSICAL    Action needed:   Patient needs office visit for Physical ., Patient needs fasting lab only appointment and schedule a mammogram     Type of outreach:    Phone, spoke to patient.  patient declines at this time   Patient is not interested in scheduling a mammogram   Questions for provider review:    Patient declines coming in every 6 months due to cost but will complete her yearly physical and labs that are covered.   Patient needs a refill of the following medication and requesting 90 day supply vs 30 days.      holestyramine light (QUESTRAN) 4 GM packet                                                                                                   Kathie Gurrola CMA       Chart routed to Provider .

## 2019-12-09 ENCOUNTER — TELEPHONE (OUTPATIENT)
Dept: FAMILY MEDICINE | Facility: CLINIC | Age: 49
End: 2019-12-09

## 2019-12-09 DIAGNOSIS — E78.5 HYPERLIPIDEMIA LDL GOAL <100: ICD-10-CM

## 2019-12-09 DIAGNOSIS — Z79.899 ENCOUNTER FOR LONG-TERM (CURRENT) USE OF MEDICATIONS: ICD-10-CM

## 2019-12-09 DIAGNOSIS — E11.9 TYPE 2 DIABETES MELLITUS WITHOUT COMPLICATION, WITHOUT LONG-TERM CURRENT USE OF INSULIN (H): Primary | ICD-10-CM

## 2019-12-09 NOTE — TELEPHONE ENCOUNTER
Please contact patient. She is not due for an office visit yet, but is due for fasting labs and a urine sample to check in.     Please assist in scheduling a lab visit.     Please also check if eye exam done in the last year. If not please remind to send us report once completed.

## 2020-02-18 ENCOUNTER — TELEPHONE (OUTPATIENT)
Dept: FAMILY MEDICINE | Facility: CLINIC | Age: 50
End: 2020-02-18

## 2020-02-18 NOTE — TELEPHONE ENCOUNTER
Summary:    Patient is due/failing the following:   Diabetic follow up, eye exam, CMP, Microalbumin, LDL and A1C    Action needed:   Patient needs office visit for Diabetic follow up. and Patient needs fasting lab only appointment    Type of outreach:    Phone, left message for patient to call back.     Questions for provider review:    None                                                                                                                                    Kathie Gurrola CMA       Chart routed to Care Team .          Panel Management Review      Patient has the following on her problem list:     Diabetes    ASA: Passed    Last A1C  Lab Results   Component Value Date    A1C 6.6 08/07/2019    A1C 6.0 11/13/2018    A1C 5.5 11/25/2014    A1C 5.5 08/26/2013    A1C 5.5 02/19/2013     A1C tested: Passed    Last LDL:    Lab Results   Component Value Date    CHOL 189 11/25/2014     Lab Results   Component Value Date    HDL 44 11/25/2014     Lab Results   Component Value Date     11/25/2014     Lab Results   Component Value Date    TRIG 105 11/25/2014     Lab Results   Component Value Date    CHOLHDLRATIO 4.3 11/25/2014     No results found for: NHDL    Is the patient on a Statin? NO             Is the patient on Aspirin? YES    Medications     HMG CoA Reductase Inhibitors     STATIN NOT PRESCRIBED, INTENTIONAL,       Salicylates     aspirin 81 MG tablet             Last three blood pressure readings:  BP Readings from Last 3 Encounters:   09/20/19 121/73   08/07/19 120/82   11/13/18 120/84            Tobacco History:     History   Smoking Status     Former Smoker     Packs/day: 0.50     Years: 10.00     Types: Cigarettes     Quit date: 1/25/1999   Smokeless Tobacco     Never Used     Comment: no smokers in household           Composite cancer screening  Chart review shows that this patient is due/due soon for the following Mammogram

## 2020-02-18 NOTE — LETTER
[facility]  [address], [address]  [facility] [address]  Phone: [phone]  Fax: [phone]  February 27, 2020      Flores Koroma  [address] [place], [place] [address] [address]      Dear Flores,    We care about your health and have reviewed your health plan including your medical conditions, medications, and lab results.  Based on this review, it is recommended that you follow up regarding the following health topic(s):  -Diabetes    We recommend you take the following action(s):  -schedule a FOLLOWUP OFFICE APPOINTMENT.  We will perform the following labs:  A1c, Lipids (fasting cholesterol - nothing to eat except water and/or meds for 8-10 hours), CMP(complete metabolic panel) and Microablumin.     Please call us at the [facility] - [phone] (or use phorus) to address the above recommendations.     Thank you for trusting [facility] and we appreciate the opportunity to serve you.  We look forward to supporting your healthcare needs in the future.    Healthy Regards,    Your Health Care Team  [facility]

## 2020-03-15 ENCOUNTER — HEALTH MAINTENANCE LETTER (OUTPATIENT)
Age: 50
End: 2020-03-15

## 2020-03-20 DIAGNOSIS — L30.9 ECZEMA, UNSPECIFIED TYPE: ICD-10-CM

## 2020-03-20 DIAGNOSIS — R05.9 COUGH: ICD-10-CM

## 2020-03-20 RX ORDER — TRIAMCINOLONE ACETONIDE 1 MG/G
CREAM TOPICAL
Qty: 15 G | Refills: 0 | Status: SHIPPED | OUTPATIENT
Start: 2020-03-20 | End: 2021-02-03

## 2020-03-20 RX ORDER — ALBUTEROL SULFATE 90 UG/1
AEROSOL, METERED RESPIRATORY (INHALATION)
Qty: 1 INHALER | Refills: 0 | Status: SHIPPED | OUTPATIENT
Start: 2020-03-20 | End: 2021-02-03

## 2020-03-20 NOTE — TELEPHONE ENCOUNTER
Patient informed. Will call back and schedule phone visit when she knows her schedule.     ACT mailed.  Lisa Gallego MA

## 2020-03-20 NOTE — TELEPHONE ENCOUNTER
Pending Prescriptions:                       Disp   Refills    VENTOLIN  (90 Base) MCG/ACT inhaler*18 Inh*0        Sig: INHALE 1-2 PUFFS INTO THE LUNGS EVERY 6 HOURS AS           NEEDED FOR SHORTNESS OF BREATH / DYSPNEA    triamcinolone (KENALOG) 0.1 % external cre*15 g   0        Sig: Apply sparingly to affected area three times daily           for up to 14 days at a time.    ACT Total Scores 11/4/2013   ACT TOTAL SCORE 25   ASTHMA ER VISITS 0 = None   ASTHMA HOSPITALIZATIONS 0 = None       Routing refill request to provider for review/approval because:  Labs out of range:  ACT    Olga Pace, MSN, RN

## 2020-04-21 ENCOUNTER — TELEPHONE (OUTPATIENT)
Dept: FAMILY MEDICINE | Facility: CLINIC | Age: 50
End: 2020-04-21

## 2020-04-21 NOTE — TELEPHONE ENCOUNTER
Summary:    Patient is due/failing the following:   Virtual follow up for diabetes     Action needed:   Patient needs office visit for follow up.    Type of outreach:    Phone, left message for patient to call back.     Questions for provider review:    None                                                                                                                                    Kathie Gurrola CMA       Chart routed to Care Team .          Panel Management Review      Patient has the following on her problem list:     Diabetes    ASA: Passed    Last A1C  Lab Results   Component Value Date    A1C 6.6 08/07/2019    A1C 6.0 11/13/2018    A1C 5.5 11/25/2014    A1C 5.5 08/26/2013    A1C 5.5 02/19/2013     A1C tested: Passed    Last LDL:    Lab Results   Component Value Date    CHOL 189 11/25/2014     Lab Results   Component Value Date    HDL 44 11/25/2014     Lab Results   Component Value Date     11/25/2014     Lab Results   Component Value Date    TRIG 105 11/25/2014     Lab Results   Component Value Date    CHOLHDLRATIO 4.3 11/25/2014     No results found for: NHDL    Is the patient on a Statin? NO             Is the patient on Aspirin? YES    Medications     HMG CoA Reductase Inhibitors     STATIN NOT PRESCRIBED, INTENTIONAL,       Salicylates     aspirin 81 MG tablet             Last three blood pressure readings:  BP Readings from Last 3 Encounters:   09/20/19 121/73   08/07/19 120/82   11/13/18 120/84            Tobacco History:     History   Smoking Status     Former Smoker     Packs/day: 0.50     Years: 10.00     Types: Cigarettes     Quit date: 1/25/1999   Smokeless Tobacco     Never Used     Comment: no smokers in household           Composite cancer screening  Chart review shows that this patient is due/due soon for the following Mammogram Declines

## 2020-07-09 NOTE — TELEPHONE ENCOUNTER
Phone person answer and hung up. Reminder letter sent to My Chart.     Kathie Gurrola, [facility]

## 2020-12-14 ENCOUNTER — TELEPHONE (OUTPATIENT)
Dept: FAMILY MEDICINE | Facility: CLINIC | Age: 50
End: 2020-12-14

## 2020-12-14 NOTE — TELEPHONE ENCOUNTER
Summary:    Patient is due/failing the following:   Diabetic follow up, A1C, CMP, LDL and Microalbumin,   Action needed:   Patient needs office visit for Diabetic follow up. and Patient needs fasting lab only appointment    Type of outreach:    Phone, left message for patient to call back.     Questions for provider review:    None                                                                                                                                    Kathie Gurrola CMA       Chart routed to Care Team .        Panel Management Review      Patient has the following on her problem list:     Diabetes    ASA: Passed    Last A1C  Lab Results   Component Value Date    A1C 6.6 08/07/2019    A1C 6.0 11/13/2018    A1C 5.5 11/25/2014    A1C 5.5 08/26/2013    A1C 5.5 02/19/2013     A1C tested: Passed    Last LDL:    Lab Results   Component Value Date    CHOL 189 11/25/2014     Lab Results   Component Value Date    HDL 44 11/25/2014     Lab Results   Component Value Date     11/25/2014     Lab Results   Component Value Date    TRIG 105 11/25/2014     Lab Results   Component Value Date    CHOLHDLRATIO 4.3 11/25/2014     No results found for: NHDL    Is the patient on a Statin? NO             Is the patient on Aspirin? YES    Medications     HMG CoA Reductase Inhibitors     STATIN NOT PRESCRIBED, INTENTIONAL,       Salicylates     aspirin 81 MG tablet             Last three blood pressure readings:  BP Readings from Last 3 Encounters:   09/20/19 121/73   08/07/19 120/82   11/13/18 120/84            Tobacco History:     History   Smoking Status     Former Smoker     Packs/day: 0.50     Years: 10.00     Types: Cigarettes     Quit date: 1/25/1999   Smokeless Tobacco     Never Used     Comment: no smokers in household           Composite cancer screening  Chart review shows that this patient is due/due soon for the following Mammogram(declines)

## 2021-01-09 ENCOUNTER — HEALTH MAINTENANCE LETTER (OUTPATIENT)
Age: 51
End: 2021-01-09

## 2021-02-01 ASSESSMENT — ENCOUNTER SYMPTOMS
ABDOMINAL PAIN: 0
DIZZINESS: 0
SORE THROAT: 0
ARTHRALGIAS: 0
DIARRHEA: 1
PALPITATIONS: 0
CONSTIPATION: 0
SHORTNESS OF BREATH: 0
JOINT SWELLING: 0
NERVOUS/ANXIOUS: 0
COUGH: 0
WEAKNESS: 0
HEARTBURN: 0
BREAST MASS: 0
EYE PAIN: 0
MYALGIAS: 0
HEMATURIA: 0
HEMATOCHEZIA: 0
PARESTHESIAS: 0
FREQUENCY: 0
FEVER: 0
HEADACHES: 0
NAUSEA: 0
CHILLS: 0
DYSURIA: 0

## 2021-02-02 ASSESSMENT — ENCOUNTER SYMPTOMS
SORE THROAT: 0
NERVOUS/ANXIOUS: 0
ARTHRALGIAS: 0
NAUSEA: 0
BREAST MASS: 0
PARESTHESIAS: 0
SHORTNESS OF BREATH: 0
FEVER: 0
MYALGIAS: 0
HEARTBURN: 0
COUGH: 0
HEMATURIA: 0
HEADACHES: 0
FREQUENCY: 0
DIZZINESS: 0
EYE PAIN: 0
CHILLS: 0
JOINT SWELLING: 0
PALPITATIONS: 0
DYSURIA: 0
HEMATOCHEZIA: 0
CONSTIPATION: 0
ABDOMINAL PAIN: 0
WEAKNESS: 0
DIARRHEA: 1

## 2021-02-02 NOTE — PROGRESS NOTES
SUBJECTIVE:   CC: Flores Koroma is an 50 year old woman who presents for preventive health visit.         Patient has been advised of split billing requirements and indicates understanding: Yes     Healthy Habits:     Getting at least 3 servings of Calcium per day:  Yes    Bi-annual eye exam:  Yes    Dental care twice a year:  Yes    Sleep apnea or symptoms of sleep apnea:  Daytime drowsiness    Diet:  Carbohydrate counting    Frequency of exercise:  2-3 days/week    Duration of exercise:  Less than 15 minutes    Taking medications regularly:  Yes    Medication side effects:  None    PHQ-2 Total Score: 0    Additional concerns today:  No        Diabetes Follow-up      How often are you checking your blood sugar? Not at all    What concerns do you have today about your diabetes? Other: Has not been following her diet or exercise plan.  She is concerned that her A1c is going to be very elevated today.     Do you have any of these symptoms? (Select all that apply)  No numbness or tingling in feet.  No redness, sores or blisters on feet.  No complaints of excessive thirst.  No reports of blurry vision.  No significant changes to weight.    Have you had a diabetic eye exam in the last 12 months? No        BP Readings from Last 2 Encounters:   02/03/21 130/76   09/20/19 121/73     Hemoglobin A1C (%)   Date Value   02/03/2021 11.4 (H)   08/07/2019 6.6 (H)     LDL Cholesterol Calculated (mg/dL)   Date Value   11/25/2014 124   07/11/2014 126         Asthma Follow-Up    Was ACT completed today?    Yes    ACT Total Scores 2/5/2021   ACT TOTAL SCORE -   ASTHMA ER VISITS -   ASTHMA HOSPITALIZATIONS -   ACT TOTAL SCORE (Goal Greater than or Equal to 20) 25   In the past 12 months, how many times did you visit the emergency room for your asthma without being admitted to the hospital? 0   In the past 12 months, how many times were you hospitalized overnight because of your asthma? 0          How many days per week do you  miss taking your asthma controller medication?  0    Please describe any recent triggers for your asthma: exercise or sports    Have you had any Emergency Room Visits, Urgent Care Visits, or Hospital Admissions since your last office visit?  No      Dumping syndrome  She has had issues since her gallbladder was removed.  Cholestyramine works well for her.  She has no concerns sermons.  Would like refills sent.    Eczema  Patient is doing well at this time.  She occasionally will need to use her prescription cream.  She uses triamcinolone for this.  She would like a refill.    Cold sore  Patient uses Valtrex as needed for recurrence of cold sores.  She would like to have some on hand.  She is currently out.      Today's PHQ-2 Score:   PHQ-2 (  [employer]) 2021   Q1: Little interest or pleasure in doing things 0   Q2: Feeling down, depressed or hopeless 0   PHQ-2 Score 0   Q1: Little interest or pleasure in doing things Not at all   Q2: Feeling down, depressed or hopeless Not at all   PHQ-2 Score 0       Abuse: Current or Past (Physical, Sexual or Emotional) - No  Do you feel safe in your environment? Yes        Social History     Tobacco Use     Smoking status: Former Smoker     Packs/day: 0.50     Years: 10.00     Pack years: 5.00     Types: Cigarettes     Quit date: 1999     Years since quittin.0     Smokeless tobacco: Never Used     Tobacco comment: no smokers in household   Substance Use Topics     Alcohol use: Yes     Comment: rarely / holidays only glass of wine          Alcohol Use 2021   Prescreen: >3 drinks/day or >7 drinks/week? No   Prescreen: >3 drinks/day or >7 drinks/week? -       Any new diagnosis of family breast, ovarian, or bowel cancer? No     Reviewed orders with patient.  Reviewed health maintenance and updated orders accordingly - Yes  BP Readings from Last 3 Encounters:   21 130/76   19 121/73   19 120/82    Wt Readings from Last 3 Encounters:   21  105.7 kg (233 lb)   09/13/19 108.9 kg (240 lb)   08/07/19 111.1 kg (245 lb)                    Breast CA Risk Screening:  No flowsheet data found.      Mammogram Screening: Recommended annual mammography.  She reports that she has no intentions of having mammograms done at this time.  Pertinent mammograms are reviewed under the imaging tab.    History of abnormal Pap smear: NO - age 30-65 PAP every 5 years with negative HPV co-testing recommended  PAP / HPV Latest Ref Rng & Units 11/13/2018 11/25/2014 12/20/2011   PAP - NIL OTHER-NIL, See Result NIL   HPV 16 DNA NEG:Negative Negative - -   HPV 18 DNA NEG:Negative Negative - -   OTHER HR HPV NEG:Negative Negative - -     Reviewed and updated as needed this visit by clinical staff  Tobacco  Allergies  Meds  Problems  Med Hx  Surg Hx  Fam Hx  Soc Hx          Reviewed and updated as needed this visit by Provider  Tobacco  Allergies  Meds  Problems  Med Hx  Surg Hx  Fam Hx             Review of Systems   Constitutional: Negative for chills and fever.   HENT: Negative for congestion, ear pain, hearing loss and sore throat.    Eyes: Negative for pain and visual disturbance.   Respiratory: Negative for cough and shortness of breath.    Cardiovascular: Negative for chest pain, palpitations and peripheral edema.   Gastrointestinal: Positive for diarrhea. Negative for abdominal pain, constipation, heartburn, hematochezia and nausea.   Breasts:  Negative for tenderness, breast mass and discharge.   Genitourinary: Negative for dysuria, frequency, genital sores, hematuria, pelvic pain, urgency, vaginal bleeding and vaginal discharge.   Musculoskeletal: Negative for arthralgias, joint swelling and myalgias.   Skin: Negative for rash.   Neurological: Negative for dizziness, weakness, headaches and paresthesias.   Psychiatric/Behavioral: Negative for mood changes. The patient is not nervous/anxious.            OBJECTIVE:   /76 (BP Location: Left arm, Patient  "Position: Chair, Cuff Size: Adult Large)   Pulse 78   Temp 97.9  F (36.6  C) (Temporal)   Resp 17   Ht 1.594 m (5' 2.75\")   Wt 105.7 kg (233 lb)   LMP  (Exact Date)   SpO2 98%   Breastfeeding No   BMI 41.60 kg/m    Physical Exam  GENERAL: healthy, alert and no distress  EYES: Eyes grossly normal to inspection, PERRL and conjunctivae and sclerae normal  HENT: ear canals and TM's normal, nose and mouth without ulcers or lesions  NECK: no adenopathy, no asymmetry, masses, or scars and thyroid normal to palpation  RESP: lungs clear to auscultation - no rales, rhonchi or wheezes  BREAST: normal without masses, tenderness or nipple discharge and no palpable axillary masses or adenopathy  CV: regular rate and rhythm, normal S1 S2, no S3 or S4, no murmur, click or rub, no peripheral edema and peripheral pulses strong  ABDOMEN: soft, nontender, no hepatosplenomegaly, no masses and bowel sounds normal  MS: no gross musculoskeletal defects noted, no edema  SKIN: no suspicious lesions or rashes  NEURO: Normal strength and tone, mentation intact and speech normal  PSYCH: mentation appears normal, affect normal/bright  Diabetic foot exam: Normal monofilament exam today.  Normal DP pulses.  Normal sensation.  Skin is intact.    Diagnostic Test Results:  Labs reviewed in Epic  Pending    ASSESSMENT/PLAN:   1. Routine general medical examination at a health care facility  See counseling messages.    2. Type 2 diabetes mellitus without complication, without long-term current use of insulin (H)  Patient is overdue for recheck on her diabetes.  Have placed orders for her lab evaluation.  Anticipate that may need to restart on Metformin.  She has been controlled with diet and exercise.  Patient is agreeable to restarting Metformin if needed.  Discussed the importance of weight management, exercise and nutrition.  Recommend we also check cholesterol.  Patient declines.  She reports is because \"I will never take a " "statin\".  Discussed with her the risks of having diabetes for heart disease and heart attack.  She still declines.  She will not allow a lipid panel to be drawn either.  - COMPREHENSIVE METABOLIC PANEL  - Albumin Random Urine Quantitative with Creat Ratio  - HEMOGLOBIN A1C  - FOOT EXAM    3. Dumping syndrome  Patient has had issues with diarrhea since her gallbladder removal.  She does well with the cholestyramine as needed.  Refills given.  - cholestyramine light (QUESTRAN) 4 GM packet; Take 1 packet (4 g) by mouth 2 times daily (with meals)  Dispense: 180 packet; Refill: 1    4. Exercise-induced asthma  She has done well.  No current concerns.  She is requesting a refill of the albuterol to have on hand.  Refills sent.  - albuterol (VENTOLIN HFA) 108 (90 Base) MCG/ACT inhaler; INHALE 1-2 PUFFS INTO THE LUNGS EVERY 6 HOURS AS NEEDED FOR SHORTNESS OF BREATH / DYSPNEA  Dispense: 1 Inhaler; Refill: 0    5. Eczema, unspecified type  Reports eczema is well controlled with periodic use of her prescription.  She would like a refill of the prescription today and this was given.  - triamcinolone (KENALOG) 0.1 % external cream; Twice daily as needed to affected area.  Dispense: 15 g; Refill: 0    6. Herpes  Reports no issues today but likes to have Valtrex on hand for cold sores as needed.  Refills given.  - valACYclovir (VALTREX) 1000 mg tablet; Take 2 tablets by mouth at onset of cold sore  Dispense: 4 tablet; Refill: 2    7. Need for hepatitis C screening test  Discussed with patient agrees to proceed.  Will notify results.  - Hepatitis C Screen Reflex to HCV RNA Quant and Genotype    8. Screening for hyperlipidemia  We will notify results.      Patient has been advised of split billing requirements and indicates understanding: Yes  COUNSELING:  Reviewed preventive health counseling, as reflected in patient instructions       Regular exercise       Healthy diet/nutrition    Estimated body mass index is 41.6 kg/m  as " "calculated from the following:    Height as of this encounter: 1.594 m (5' 2.75\").    Weight as of this encounter: 105.7 kg (233 lb).    Weight management plan: Discussed healthy diet and exercise guidelines    She reports that she quit smoking about 22 years ago. Her smoking use included cigarettes. She has a 5.00 pack-year smoking history. She has never used smokeless tobacco.      Counseling Resources:  ATP IV Guidelines  Pooled Cohorts Equation Calculator  Breast Cancer Risk Calculator  BRCA-Related Cancer Risk Assessment: FHS-7 Tool  FRAX Risk Assessment  ICSI Preventive Guidelines  Dietary Guidelines for Americans, 2010  USDA's MyPlate  ASA Prophylaxis  Lung CA Screening    Lia Patel MD  [facility]  "

## 2021-02-03 ENCOUNTER — OFFICE VISIT (OUTPATIENT)
Dept: FAMILY MEDICINE | Facility: CLINIC | Age: 51
End: 2021-02-03
Payer: COMMERCIAL

## 2021-02-03 VITALS
SYSTOLIC BLOOD PRESSURE: 130 MMHG | HEART RATE: 78 BPM | HEIGHT: 63 IN | WEIGHT: 233 LBS | OXYGEN SATURATION: 98 % | RESPIRATION RATE: 17 BRPM | BODY MASS INDEX: 41.29 KG/M2 | TEMPERATURE: 97.9 F | DIASTOLIC BLOOD PRESSURE: 76 MMHG

## 2021-02-03 DIAGNOSIS — Z11.59 NEED FOR HEPATITIS C SCREENING TEST: ICD-10-CM

## 2021-02-03 DIAGNOSIS — Z00.00 ROUTINE GENERAL MEDICAL EXAMINATION AT A HEALTH CARE FACILITY: Primary | ICD-10-CM

## 2021-02-03 DIAGNOSIS — J45.990 EXERCISE-INDUCED ASTHMA: ICD-10-CM

## 2021-02-03 DIAGNOSIS — E11.9 TYPE 2 DIABETES MELLITUS WITHOUT COMPLICATION, WITHOUT LONG-TERM CURRENT USE OF INSULIN (H): ICD-10-CM

## 2021-02-03 DIAGNOSIS — L30.9 ECZEMA, UNSPECIFIED TYPE: ICD-10-CM

## 2021-02-03 DIAGNOSIS — Z13.220 SCREENING FOR HYPERLIPIDEMIA: ICD-10-CM

## 2021-02-03 DIAGNOSIS — K91.1 DUMPING SYNDROME: ICD-10-CM

## 2021-02-03 DIAGNOSIS — B00.9 HERPES: ICD-10-CM

## 2021-02-03 LAB — HBA1C MFR BLD: 11.4 % (ref 0–5.6)

## 2021-02-03 PROCEDURE — 99207 PR FOOT EXAM NO CHARGE: CPT | Mod: 25 | Performed by: FAMILY MEDICINE

## 2021-02-03 PROCEDURE — 99396 PREV VISIT EST AGE 40-64: CPT | Performed by: FAMILY MEDICINE

## 2021-02-03 PROCEDURE — 36415 COLL VENOUS BLD VENIPUNCTURE: CPT | Performed by: FAMILY MEDICINE

## 2021-02-03 PROCEDURE — 80053 COMPREHEN METABOLIC PANEL: CPT | Performed by: FAMILY MEDICINE

## 2021-02-03 PROCEDURE — 86803 HEPATITIS C AB TEST: CPT | Performed by: FAMILY MEDICINE

## 2021-02-03 PROCEDURE — 83036 HEMOGLOBIN GLYCOSYLATED A1C: CPT | Performed by: FAMILY MEDICINE

## 2021-02-03 PROCEDURE — 82043 UR ALBUMIN QUANTITATIVE: CPT | Performed by: FAMILY MEDICINE

## 2021-02-03 PROCEDURE — 99214 OFFICE O/P EST MOD 30 MIN: CPT | Mod: 25 | Performed by: FAMILY MEDICINE

## 2021-02-03 RX ORDER — CHOLESTYRAMINE LIGHT 4 G/5.7G
4 POWDER, FOR SUSPENSION ORAL 2 TIMES DAILY WITH MEALS
Qty: 180 PACKET | Refills: 1 | Status: SHIPPED | OUTPATIENT
Start: 2021-02-03

## 2021-02-03 RX ORDER — VALACYCLOVIR HYDROCHLORIDE 1 G/1
TABLET, FILM COATED ORAL
Qty: 4 TABLET | Refills: 2 | Status: SHIPPED | OUTPATIENT
Start: 2021-02-03

## 2021-02-03 RX ORDER — TRIAMCINOLONE ACETONIDE 1 MG/G
CREAM TOPICAL
Qty: 15 G | Refills: 0 | Status: SHIPPED | OUTPATIENT
Start: 2021-02-03

## 2021-02-03 RX ORDER — ALBUTEROL SULFATE 90 UG/1
AEROSOL, METERED RESPIRATORY (INHALATION)
Qty: 1 INHALER | Refills: 0 | Status: SHIPPED | OUTPATIENT
Start: 2021-02-03 | End: 2021-03-01

## 2021-02-03 ASSESSMENT — PAIN SCALES - GENERAL: PAINLEVEL: NO PAIN (0)

## 2021-02-03 ASSESSMENT — MIFFLIN-ST. JEOR: SCORE: 1632.04

## 2021-02-04 ENCOUNTER — TELEPHONE (OUTPATIENT)
Dept: FAMILY MEDICINE | Facility: CLINIC | Age: 51
End: 2021-02-04

## 2021-02-04 DIAGNOSIS — E11.9 TYPE 2 DIABETES MELLITUS WITHOUT COMPLICATION, WITHOUT LONG-TERM CURRENT USE OF INSULIN (H): Primary | ICD-10-CM

## 2021-02-04 LAB
ALBUMIN SERPL-MCNC: 4 G/DL (ref 3.4–5)
ALP SERPL-CCNC: 82 U/L (ref 40–150)
ALT SERPL W P-5'-P-CCNC: 52 U/L (ref 0–50)
ANION GAP SERPL CALCULATED.3IONS-SCNC: 7 MMOL/L (ref 3–14)
AST SERPL W P-5'-P-CCNC: 41 U/L (ref 0–45)
BILIRUB SERPL-MCNC: 0.7 MG/DL (ref 0.2–1.3)
BUN SERPL-MCNC: 12 MG/DL (ref 7–30)
CALCIUM SERPL-MCNC: 9.5 MG/DL (ref 8.5–10.1)
CHLORIDE SERPL-SCNC: 99 MMOL/L (ref 94–109)
CO2 SERPL-SCNC: 28 MMOL/L (ref 20–32)
CREAT SERPL-MCNC: 0.62 MG/DL (ref 0.52–1.04)
CREAT UR-MCNC: 90 MG/DL
GFR SERPL CREATININE-BSD FRML MDRD: >90 ML/MIN/{1.73_M2}
GLUCOSE SERPL-MCNC: 253 MG/DL (ref 70–99)
HCV AB SERPL QL IA: NONREACTIVE
MICROALBUMIN UR-MCNC: 30 MG/L
MICROALBUMIN/CREAT UR: 33.82 MG/G CR (ref 0–25)
POTASSIUM SERPL-SCNC: 3.6 MMOL/L (ref 3.4–5.3)
PROT SERPL-MCNC: 8.7 G/DL (ref 6.8–8.8)
SODIUM SERPL-SCNC: 134 MMOL/L (ref 133–144)

## 2021-02-04 RX ORDER — LISINOPRIL 10 MG/1
10 TABLET ORAL DAILY
Qty: 30 TABLET | Refills: 2 | Status: SHIPPED | OUTPATIENT
Start: 2021-02-04 | End: 2021-02-08

## 2021-02-06 ASSESSMENT — ASTHMA QUESTIONNAIRES: ACT_TOTALSCORE: 25

## 2021-02-08 DIAGNOSIS — E11.9 TYPE 2 DIABETES MELLITUS WITHOUT COMPLICATION, WITHOUT LONG-TERM CURRENT USE OF INSULIN (H): ICD-10-CM

## 2021-02-08 RX ORDER — LISINOPRIL 10 MG/1
10 TABLET ORAL DAILY
Qty: 90 TABLET | Refills: 1 | Status: SHIPPED | OUTPATIENT
Start: 2021-02-08 | End: 2021-09-03

## 2021-03-01 DIAGNOSIS — J45.990 EXERCISE-INDUCED ASTHMA: ICD-10-CM

## 2021-03-01 DIAGNOSIS — E11.9 TYPE 2 DIABETES MELLITUS WITHOUT COMPLICATION, WITHOUT LONG-TERM CURRENT USE OF INSULIN (H): ICD-10-CM

## 2021-03-01 RX ORDER — ALBUTEROL SULFATE 90 UG/1
AEROSOL, METERED RESPIRATORY (INHALATION)
Qty: 6.7 INHALER | Refills: 1 | Status: SHIPPED | OUTPATIENT
Start: 2021-03-01

## 2021-03-13 ENCOUNTER — HEALTH MAINTENANCE LETTER (OUTPATIENT)
Age: 51
End: 2021-03-13

## 2021-04-23 ENCOUNTER — TELEPHONE (OUTPATIENT)
Dept: FAMILY MEDICINE | Facility: CLINIC | Age: 51
End: 2021-04-23

## 2021-04-23 NOTE — TELEPHONE ENCOUNTER
Patient Quality Outreach Summary      Summary:    Patient is due/failing the following:   A1C    Type of outreach:    schedule a lab only appointment    Questions for provider review:    [facility]  Will wait for records to update patients chart.                                                                                                                   Kathie Gurrola [facility]       Chart routed to Care Team.

## 2021-08-28 ENCOUNTER — HEALTH MAINTENANCE LETTER (OUTPATIENT)
Age: 51
End: 2021-08-28

## 2021-09-01 ENCOUNTER — E-VISIT (OUTPATIENT)
Dept: URGENT CARE | Facility: URGENT CARE | Age: 51
End: 2021-09-01
Payer: COMMERCIAL

## 2021-09-01 ENCOUNTER — LAB (OUTPATIENT)
Dept: URGENT CARE | Facility: URGENT CARE | Age: 51
End: 2021-09-01
Attending: PHYSICIAN ASSISTANT
Payer: COMMERCIAL

## 2021-09-01 DIAGNOSIS — Z20.822 SUSPECTED COVID-19 VIRUS INFECTION: Primary | ICD-10-CM

## 2021-09-01 DIAGNOSIS — Z20.822 SUSPECTED COVID-19 VIRUS INFECTION: ICD-10-CM

## 2021-09-01 PROCEDURE — 99421 OL DIG E/M SVC 5-10 MIN: CPT | Performed by: PHYSICIAN ASSISTANT

## 2021-09-01 PROCEDURE — U0003 INFECTIOUS AGENT DETECTION BY NUCLEIC ACID (DNA OR RNA); SEVERE ACUTE RESPIRATORY SYNDROME CORONAVIRUS 2 (SARS-COV-2) (CORONAVIRUS DISEASE [COVID-19]), AMPLIFIED PROBE TECHNIQUE, MAKING USE OF HIGH THROUGHPUT TECHNOLOGIES AS DESCRIBED BY CMS-2020-01-R: HCPCS

## 2021-09-01 PROCEDURE — U0005 INFEC AGEN DETEC AMPLI PROBE: HCPCS

## 2021-09-01 NOTE — PATIENT INSTRUCTIONS
Dear Flores Koroma,    Your symptoms show that you may have coronavirus (COVID-19). This illness can cause fever, cough and trouble breathing. Many people get a mild case and get better on their own. Some people can get very sick.    Will I be tested for COVID-19?  We would like to test you for Covid-19 virus. I have placed orders for this test.     To schedule: go to your [employer] home page and scroll down to the section that says  You have an appointment that needs to be scheduled  and click the large green button that says  Schedule Now  and follow the steps to find the next available openings.    If you are unable to complete these [employer] scheduling steps, please call [phone] to schedule your testing.     Return to work/school/ guidance:  Please let your workplace manager and staffing office know when your quarantine ends     We can t give you an exact date as it depends on the above. You can calculate this on your own or work with your manager/staffing office to calculate this. (For example if you were exposed on 10/4, you would have to quarantine for 14 full days. That would be through 10/18. You could return on 10/19.)      If you receive a positive COVID-19 test result, follow the guidance of the those who are giving you the results. Usually the return to work is 10 (or in some cases 20 days from symptom onset.) If you work at [facility], you must also be cleared by Employee Occupational Health and Safety to return to work.        If you receive a negative COVID-19 test result and did not have a high risk exposure to someone with a known positive COVID-19 test, you can return to work once you're free of fever for 24 hours without fever-reducing medication and your symptoms are improving or resolved.      If you receive a negative COVID-19 test and If you had a high risk exposure to someone who has tested positive for COVID-19 then you can return to work 14 days after your last  contact with the positive individual    Note: If you have ongoing exposure to the covid positive person, this quarantine period may be more than 14 days. (For example, if you are continued to be exposed to your child who tested positive and cannot isolate from them, then the quarantine of 7-14 days can't start until your child is no longer contagious. This is typically 10 days from onset of the child's symptoms. So the total duration may be 17-24 days in this case.)    Sign up for [employer].   We know it's scary to hear that you might have COVID-19. We want to track your symptoms to make sure you're okay over the next 2 weeks. Please look for an email from [employer]--this is a free, online program that we'll use to keep in touch. To sign up, follow the link in the email you will receive. Learn more at [email or web address]    How can I take care of myself?    Get lots of rest. Drink extra fluids (unless a doctor has told you not to)    Take Tylenol (acetaminophen) or ibuprofen for fever or pain. If you have liver or kidney problems, ask your family doctor if it's okay to take Tylenol o ibuprofen    If you have other health problems (like cancer, heart failure, an organ transplant or severe kidney disease): Call your specialty clinic if you don't feel better in the next 2 days.    Know when to call 911. Emergency warning signs include:  o Trouble breathing or shortness of breath  o Pain or pressure in the chest that doesn't go away  o Feeling confused like you haven't felt before, or not being able to wake up  o Bluish-colored lips or face    Where can I get more information?  [facility] - About COVID-19:   [email or web address]    CDC - What to Do If You're Sick:   www.cdc.gov/coronavirus/2019-ncov/about/steps-when-sick.html

## 2021-09-02 ENCOUNTER — TELEPHONE (OUTPATIENT)
Dept: URGENT CARE | Facility: URGENT CARE | Age: 51
End: 2021-09-02

## 2021-09-02 ENCOUNTER — MYC MEDICAL ADVICE (OUTPATIENT)
Dept: FAMILY MEDICINE | Facility: CLINIC | Age: 51
End: 2021-09-02

## 2021-09-02 DIAGNOSIS — U07.1 CLINICAL DIAGNOSIS OF COVID-19: ICD-10-CM

## 2021-09-02 DIAGNOSIS — U07.1 2019 NOVEL CORONAVIRUS DISEASE (COVID-19): Primary | ICD-10-CM

## 2021-09-02 LAB — SARS-COV-2 RNA RESP QL NAA+PROBE: POSITIVE

## 2021-09-02 NOTE — TELEPHONE ENCOUNTER
"Coronavirus (COVID-19) Notification    Caller Name (Patient, parent, daughter/son, grandparent, etc)  patient    Reason for call  Notify of Positive Coronavirus (COVID-19) lab results, assess symptoms,  review  [employer] [facility] recommendations    Lab Result    Lab test:  2019-nCoV rRt-PCR or SARS-CoV-2 PCR    Oropharyngeal AND/OR nasopharyngeal swabs is POSITIVE for 2019-nCoV RNA/SARS-COV-2 PCR (COVID-19 virus)    RN Recommendations/Instructions per [facility] Coronavirus COVID-19 recommendations    Brief introduction script  Introduce self then review script:  \"I am calling on behalf of [employer].  We were notified that your Coronavirus test (COVID-19) for was POSITIVE for the virus.  I have some information to relay to you but first I wanted to mention that the [place] Dept of Health will be contacting you shortly [it's possible MD already called Patient] to talk to you more about how you are feeling and other people you have had contact with who might now also have the virus.  Also,  [employer] [facility] is Partnering with the [facility] for Covid-19 research, you may be contacted directly by research staff.\"    Assessment (Inquire about Patient's current symptoms)   Assessment   Current Symptoms at time of phone call: (if no symptoms, document No symptoms] Cough, head congestion, chills, fatigue, cough, loss of taste and smell    Symptoms onset (if applicable) 8/28/2021     If at time of call, Patients symptoms hare worsened, the Patient should contact 911 or have someone drive them to Emergency Dept promptly:      If Patient calling 911, inform 911 personal that you have tested positive for the Coronavirus (COVID-19).  Place mask on and await 911 to arrive.    If Emergency Dept, If possible, please have another adult drive you to the Emergency Dept but you need to wear mask when in contact with other people.      Monoclonal Antibody Administration    You may be eligible to receive a new treatment " "with a monoclonal antibody for preventing hospitalization in patients at high risk for complications from COVID-19.   This medication is still experimental and available on a limited basis; it is given through an IV and must be given at an infusion center. Please note that not all people who are eligible will receive the medication since it is in limited supply.     Are you interested in being considered for this medication?  Yes.   Is the patient symptomatic?  Yes. Is the patient 18 years of age or older? Yes.  Is the patient newly (within the last week) on supplemental oxygen or requiring more oxygen than usual?  No. Patient criteria for selection: Is the patients weight equal to or greater than 40 kg (88 lbs)? Yes.  Is the patient's age 65 years or older?  No. Is the patient 55 years or older and have one or more of the following conditions: Hypertension, CHF, COPD/Chronic pulmonary disease?  No. Is the patient 18 years or older and has one or more of the following conditions: Chronic Kidney Disease, Diabetes Mellitus, BMI equal to or greater than 35, Immunosuppressive Disease or taking Immunosuppressive medications?  Yes.  Patient qualifies, refer [place].  Does the patient fit the criteria: Yes: Patient referred to [place] website, patient or family/friend will complete application.    If patient qualifies based on above criteria:  \"You will be contacted if you are selected to receive this treatment in the next 1-2 business days.   This is time sensitive and if you are not selected in the next 1-2 business days, you will not receive the medication.  If you do not receive a call to schedule, you have not been selected.\"    Patient refuses any other Covid education. Reports   just had the infusion treatment.    A Positive COVID-19 letter will be sent via [employer] or the mail. (Exception, no letters sent to Presurgerical/Preprocedure Patients)    Riya Rueda LPN          "

## 2021-09-04 ENCOUNTER — PATIENT OUTREACH (OUTPATIENT)
Dept: CARE COORDINATION | Facility: CLINIC | Age: 51
End: 2021-09-04

## 2021-09-04 NOTE — TELEPHONE ENCOUNTER
Received notification of patient's report of worsening shortness of breath via [employer]. Called patient and left message. Will send message also in [employer]. Will make second attempt shortly

## 2021-09-04 NOTE — TELEPHONE ENCOUNTER
"Called patient. She had an e-visit this morning with an [facility] provider and her  will be picking up prescriptions for \"a steroid\" and a Zpack. She is trying to get monoclonal antibody therapy scheduled for Tuesday. Will send patient information via [employer] for home treatment measures for management of cough, worrisome signs/symptoms that require urgent medical evaluation and 24/7 [facility] phone number should patient have questions or concerns after hours. Patient verbalizes agreement with plan.    "

## 2021-09-05 ENCOUNTER — PATIENT OUTREACH (OUTPATIENT)
Dept: CARE COORDINATION | Facility: CLINIC | Age: 51
End: 2021-09-05

## 2021-09-05 NOTE — TELEPHONE ENCOUNTER
"Received notification of patient's report of home oximetry reading of 89-90% at rest. Wrote back and it federico to 91-92%. She was able to \"do the chickens\" today and is walking around without shortness of breath. She was concerned about the lower reading. Started zpack and prednisone yesterday as prescribed by E-visit provider. Had poor sleep last night likely due to prednisone and we discussed taking the medication first thing in the morning so that her second dose isn't as close to bedtime to see if this helps with sleep. Advised deep breathing when she is sitting her oxygen saturation levels mildly drop at rest. She is otherwise doing well and is reassured by the outreach call. She will continue to update me in the [facility] as needed.   "

## 2021-09-13 ENCOUNTER — TELEPHONE (OUTPATIENT)
Dept: FAMILY MEDICINE | Facility: CLINIC | Age: 51
End: 2021-09-13

## 2021-09-13 NOTE — TELEPHONE ENCOUNTER
Patient Quality Outreach Summary      Summary:    Patient is due/failing the following:   Diabetic Follow-Up Visit    Type of outreach:    [facility]     Questions for provider review:    None                                                                                                                    Kathie Gurrola CMA     Chart routed to Care Team.

## 2021-10-23 ENCOUNTER — HEALTH MAINTENANCE LETTER (OUTPATIENT)
Age: 51
End: 2021-10-23

## 2022-04-09 ENCOUNTER — HEALTH MAINTENANCE LETTER (OUTPATIENT)
Age: 52
End: 2022-04-09

## 2022-10-09 ENCOUNTER — HEALTH MAINTENANCE LETTER (OUTPATIENT)
Age: 52
End: 2022-10-09

## 2022-11-26 ENCOUNTER — HEALTH MAINTENANCE LETTER (OUTPATIENT)
Age: 52
End: 2022-11-26

## 2023-05-21 ENCOUNTER — HEALTH MAINTENANCE LETTER (OUTPATIENT)
Age: 53
End: 2023-05-21

## 2023-06-10 ENCOUNTER — HEALTH MAINTENANCE LETTER (OUTPATIENT)
Age: 53
End: 2023-06-10

## 2024-01-06 ENCOUNTER — HEALTH MAINTENANCE LETTER (OUTPATIENT)
Age: 54
End: 2024-01-06

## 2024-05-25 ENCOUNTER — HEALTH MAINTENANCE LETTER (OUTPATIENT)
Age: 54
End: 2024-05-25

## 2024-08-03 ENCOUNTER — HEALTH MAINTENANCE LETTER (OUTPATIENT)
Age: 54
End: 2024-08-03

## (undated) DEVICE — PREP CHLORAPREP 26ML TINTED ORANGE  260815

## (undated) DEVICE — SOL WATER IRRIG 1000ML BOTTLE 07139-09

## (undated) RX ORDER — FENTANYL CITRATE 50 UG/ML
INJECTION, SOLUTION INTRAMUSCULAR; INTRAVENOUS
Status: DISPENSED
Start: 2019-09-20

## (undated) RX ORDER — SIMETHICONE 40MG/0.6ML
SUSPENSION, DROPS(FINAL DOSAGE FORM)(ML) ORAL
Status: DISPENSED
Start: 2019-09-20